# Patient Record
Sex: FEMALE | Race: WHITE | Employment: OTHER | URBAN - METROPOLITAN AREA
[De-identification: names, ages, dates, MRNs, and addresses within clinical notes are randomized per-mention and may not be internally consistent; named-entity substitution may affect disease eponyms.]

---

## 2022-08-24 ENCOUNTER — OUTSIDE SERVICES (OUTPATIENT)
Dept: PRIMARY CARE CLINIC | Age: 82
End: 2022-08-24
Payer: COMMERCIAL

## 2022-08-24 DIAGNOSIS — F25.9 SCHIZOAFFECTIVE DISORDER, UNSPECIFIED TYPE (HCC): ICD-10-CM

## 2022-08-24 DIAGNOSIS — I50.41 ACUTE COMBINED SYSTOLIC AND DIASTOLIC HEART FAILURE (HCC): ICD-10-CM

## 2022-08-24 DIAGNOSIS — I10 HYPERTENSION, UNSPECIFIED TYPE: ICD-10-CM

## 2022-08-24 DIAGNOSIS — F01.50 VASCULAR DEMENTIA, UNCOMPLICATED (HCC): Primary | ICD-10-CM

## 2022-08-24 PROCEDURE — 99308 SBSQ NF CARE LOW MDM 20: CPT | Performed by: INTERNAL MEDICINE

## 2022-08-24 ASSESSMENT — VISUAL ACUITY: OU: 1

## 2022-08-24 ASSESSMENT — ENCOUNTER SYMPTOMS: BLURRED VISION: 0

## 2022-08-24 NOTE — PROGRESS NOTES
Visit Date: 8/3/22  Delbert Chu  1940  female 78 y.o. Subjective:    CC: Patient presents with dementia. Patient presents for follow up of htn, chf .    HPI:  Dementia: Patient feeling about the same compared to last visit. Condition has been mostly well controlled. Taking medication as prescribed. No medication side effects noted. Trying to follow recommended diet. Present for awhile. No precipitating event. Rated as mild to moderate in severity. Has been progressive since onset. Schizophrenia: Patient feeling about the same compared to last visit. Condition has been improving. Taking medication as prescribed. No medication side effects noted. Trying to follow recommended diet. Present for awhile. No precipitating event. Rated as mild to moderate in severity. Has been progressive since onset. Dysphagia. SHE IS DOING WELL IN THERAPY SHE IS NPO AND TOLERATING TUBE FEED SHE IS GAINING WEIGHT Patient feeling about the same compared to last visit. Condition has been improving. Not taking any medication at this time. No medication side effects noted. Trying to follow recommended diet. Present for awhile. Onset was gradual. Progressive. Rated as mild in severity. Reports associated swallowing difficulty. Hypertension  This is a chronic problem. The current episode started more than 1 year ago. The problem is unchanged. The problem is controlled. Pertinent negatives include no anxiety or blurred vision. There are no associated agents to hypertension. There are no known risk factors for coronary artery disease. Treatments tried: taking medications, no medication side effects. There are no compliance problems. Congestive Heart Failure  Presents for follow-up visit. Pertinent negatives include no edema. The symptoms have been stable. ROS:  Review of Systems   Unable to perform ROS: Dementia   Eyes:  Negative for blurred vision.       Current Meds: Refer to nursing home record    PMH:    Medical Problems: reviewed and updated     Surgical Hx: reviewed and updated     FH: reviewed and updated       SH: reviewed and updated       Objective: Wt: 130 BP: 138/72 Pulse: 80 Resp: 17 T: 97.8F     Physical Exam:  Physical Exam  Vitals reviewed. Constitutional:       General: She is awake. She is not in acute distress. Appearance: Normal appearance. She is normal weight. She is not ill-appearing or toxic-appearing. Comments: Confused   Appears appropriate for age   HENT:      Head: Normocephalic and atraumatic. Right Ear: Tympanic membrane and external ear normal.      Left Ear: Tympanic membrane and external ear normal.      Ears:      Comments: Middle ear well aerated. Nose: Nose normal.      Mouth/Throat:      Mouth: Mucous membranes are moist.      Dentition: Normal dentition. No gingival swelling or dental caries. Pharynx: Oropharynx is clear. Uvula midline. Comments: Gums appear healthy. No thrush no mucositis  Eyes:      General: Vision grossly intact. Right eye: No discharge. Left eye: No discharge. Extraocular Movements: Extraocular movements intact. Conjunctiva/sclera: Conjunctivae normal.      Pupils: Pupils are equal, round, and reactive to light. Neck:      Vascular: No carotid bruit. Comments: Thyroid is normal in size. Cardiovascular:      Rate and Rhythm: Normal rate and regular rhythm. Pulses: Normal pulses. Heart sounds: Normal heart sounds, S1 normal and S2 normal.   Pulmonary:      Effort: Pulmonary effort is normal.      Breath sounds: Normal breath sounds. Abdominal:      General: Bowel sounds are normal. There is no distension. Palpations: Abdomen is soft. There is no mass. Tenderness: There is no abdominal tenderness. There is no guarding or rebound. Hernia: No hernia is present. Comments: Peg tube in place No rigidity   Musculoskeletal:         General: Normal range of motion.       Right upper arm: Normal.      Left upper arm: Normal.      Cervical back: Normal range of motion. Right upper leg: Normal.      Left upper leg: Normal.      Right lower leg: Normal.      Left lower leg: Normal.   Lymphadenopathy:      Comments: No palpable or visible regional lymphadenopathy   Skin:     General: Skin is warm and dry. Findings: No bruising, ecchymosis, lesion or rash. Neurological:      General: No focal deficit present. Mental Status: Mental status is at baseline. Cranial Nerves: Cranial nerves are intact. No cranial nerve deficit. Deep Tendon Reflexes: Reflexes normal.      Comments:      Psychiatric:         Mood and Affect: Mood and affect normal. Mood is not depressed. Behavior: Behavior normal. Behavior is not agitated. Thought Content: Thought content normal.         Judgment: Judgment normal.      Comments: No apparent anxiety       Assessment & Plan:  1. Vascular dementia, uncomplicated ([facility]  2. Schizoaffective disorder, unspecified type ([facility] [address]  3. Acute combined systolic and diastolic heart failure ([address]  4. Hypertension, unspecified type     Chart reviewed   Medications reviewed   Monitor labs   Continue current treatment     Bryon LOAIZA MA  am scribing for Dr. Bridgette Mancilla.  [facility]   8/24/22    NATALY Alarcon Verdie How, MD, personally performed the services described in this documentation as scribed by Bryon Kelley and it is both accurate and complete

## 2022-09-07 ENCOUNTER — OUTSIDE SERVICES (OUTPATIENT)
Dept: PRIMARY CARE CLINIC | Age: 82
End: 2022-09-07
Payer: COMMERCIAL

## 2022-09-07 DIAGNOSIS — F01.50 VASCULAR DEMENTIA, UNCOMPLICATED (HCC): Primary | ICD-10-CM

## 2022-09-07 DIAGNOSIS — I50.41 ACUTE COMBINED SYSTOLIC AND DIASTOLIC HEART FAILURE (HCC): ICD-10-CM

## 2022-09-07 DIAGNOSIS — F25.9 SCHIZOAFFECTIVE DISORDER, UNSPECIFIED TYPE (HCC): ICD-10-CM

## 2022-09-07 DIAGNOSIS — I10 HYPERTENSION, UNSPECIFIED TYPE: ICD-10-CM

## 2022-09-07 PROCEDURE — 99309 SBSQ NF CARE MODERATE MDM 30: CPT | Performed by: INTERNAL MEDICINE

## 2022-10-05 ENCOUNTER — OUTSIDE SERVICES (OUTPATIENT)
Dept: PRIMARY CARE CLINIC | Age: 82
End: 2022-10-05
Payer: MEDICARE

## 2022-10-05 DIAGNOSIS — F01.50 VASCULAR DEMENTIA, UNCOMPLICATED (HCC): Primary | ICD-10-CM

## 2022-10-05 DIAGNOSIS — I50.41 ACUTE COMBINED SYSTOLIC AND DIASTOLIC HEART FAILURE (HCC): ICD-10-CM

## 2022-10-05 DIAGNOSIS — I10 HYPERTENSION, UNSPECIFIED TYPE: ICD-10-CM

## 2022-10-05 DIAGNOSIS — F25.9 SCHIZOAFFECTIVE DISORDER, UNSPECIFIED TYPE (HCC): ICD-10-CM

## 2022-10-05 PROCEDURE — 99309 SBSQ NF CARE MODERATE MDM 30: CPT | Performed by: INTERNAL MEDICINE

## 2022-10-10 ASSESSMENT — ENCOUNTER SYMPTOMS: BLURRED VISION: 0

## 2022-10-10 ASSESSMENT — VISUAL ACUITY: OU: 1

## 2022-10-10 NOTE — PROGRESS NOTES
Visit Date: 9/7/22  Wang Chu  1940  female 78 y.o. Subjective:    CC: Patient presents with dementia. Patient presents for follow up of htn, chf .    HPI:  Dysphagia. SHE IS DOING WELL IN THERAPY SHE IS NPO AND TOLERATING TUBE FEED SHE IS GAINING WEIGHT Patient feeling about the same compared to last visit. Condition has been improving. Not taking any medication at this time. No medication side effects noted. Trying to follow recommended diet. Present for awhile. Onset was gradual. Progressive. Rated as mild in severity. Reports associated swallowing difficulty. Hypertension  This is a chronic problem. The current episode started more than 1 year ago. The problem is unchanged. The problem is controlled. Pertinent negatives include no anxiety or blurred vision. There are no associated agents to hypertension. There are no known risk factors for coronary artery disease. Treatments tried: taking medications, no medication side effects. There are no compliance problems. Congestive Heart Failure  Presents for follow-up visit. Pertinent negatives include no edema. The symptoms have been stable. Memory Loss    Patient reports onset of memory loss was more than 1 year ago. Onset quality is gradual.     Symptoms associated with memory loss include changes in short-term memory and changes in long-term memory. Behavorial problems for memory loss include hallucinations. Patient does not have the following behavorial problems associated with memory loss: agitation. Family and/or patient concerns for memory loss include wandering. Patient lives in a/an skilled nursing facility. Mental Health Problem  Primary symptoms comment: schizophrenia. This is a chronic problem. The degree of incapacity that she is experiencing as a consequence of her illness is moderate. She does not have a plan to attempt suicide. She does not contemplate injuring another person.  She has not already  injured another person. Risk factors that are present for mental illness include a history of mental illness. ROS:  Review of Systems   Unable to perform ROS: Dementia   Eyes:  Negative for blurred vision. Current Meds: Refer to nursing home record    PMH:    Medical Problems: reviewed and updated     Surgical Hx: reviewed and updated     FH: reviewed and updated       SH: reviewed and updated       Objective: Wt: 131.1 BP: 128/74 Pulse: 69 Resp: 18 T: 97 F     Physical Exam:  Physical Exam  Vitals reviewed. Constitutional:       General: She is awake. She is not in acute distress. Appearance: Normal appearance. She is normal weight. She is not ill-appearing or toxic-appearing. Comments: Confused   Appears appropriate for age   HENT:      Head: Normocephalic and atraumatic. Right Ear: Tympanic membrane and external ear normal.      Left Ear: Tympanic membrane and external ear normal.      Ears:      Comments: Middle ear well aerated. Nose: Nose normal.      Mouth/Throat:      Mouth: Mucous membranes are moist.      Dentition: Normal dentition. No gingival swelling or dental caries. Pharynx: Oropharynx is clear. Uvula midline. Comments: Gums appear healthy. No thrush no mucositis  Eyes:      General: Vision grossly intact. Right eye: No discharge. Left eye: No discharge. Extraocular Movements: Extraocular movements intact. Conjunctiva/sclera: Conjunctivae normal.      Pupils: Pupils are equal, round, and reactive to light. Neck:      Vascular: No carotid bruit. Comments: Thyroid is normal in size. Cardiovascular:      Rate and Rhythm: Normal rate and regular rhythm. Pulses: Normal pulses. Heart sounds: Normal heart sounds, S1 normal and S2 normal.   Pulmonary:      Effort: Pulmonary effort is normal.      Breath sounds: Normal breath sounds. Abdominal:      General: Bowel sounds are normal. There is no distension.       Palpations: Abdomen is soft. There is no mass. Tenderness: There is no abdominal tenderness. There is no guarding or rebound. Hernia: No hernia is present. Comments: Peg tube in place No rigidity   Musculoskeletal:         General: Normal range of motion. Right upper arm: Normal.      Left upper arm: Normal.      Cervical back: Normal range of motion. Right upper leg: Normal.      Left upper leg: Normal.      Right lower leg: Normal.      Left lower leg: Normal.   Lymphadenopathy:      Comments: No palpable or visible regional lymphadenopathy   Skin:     General: Skin is warm and dry. Findings: No bruising, ecchymosis, lesion or rash. Neurological:      General: No focal deficit present. Mental Status: Mental status is at baseline. Cranial Nerves: No cranial nerve deficit. Deep Tendon Reflexes: Reflexes normal.      Comments:      Psychiatric:         Mood and Affect: Mood and affect normal. Mood is not depressed. Behavior: Behavior normal. Behavior is not agitated. Thought Content: Thought content normal.         Judgment: Judgment normal.      Comments: No apparent anxiety       Assessment & Plan:  1. Vascular dementia, uncomplicated ([address]  2. Schizoaffective disorder, unspecified type ([address]  3. Acute combined systolic and diastolic heart failure ([address]  4. Hypertension, unspecified type     Chart reviewed   Medications reviewed   Monitor labs   Continue current treatment     Emma LOAIZA MA  am scribing for Dr. Signa Libman.  Astrid Bryant, [address] [place] [address] Jing Palacios   IBriseyda MD, personally performed the services described in this documentation as scribed by Emma Rey and it is both accurate and complete

## 2022-11-01 ASSESSMENT — ENCOUNTER SYMPTOMS: BLURRED VISION: 0

## 2022-11-01 ASSESSMENT — VISUAL ACUITY: OU: 1

## 2022-11-01 NOTE — PROGRESS NOTES
Visit Date: 10/5/22  Katelyn Chu  1940  female 78 y.o. Subjective:    CC: Patient presents with dementia. Patient presents for follow up of htn, chf .    HPI:  Dysphagia. SHE IS DOING WELL IN THERAPY SHE IS NPO AND TOLERATING TUBE FEED SHE IS GAINING WEIGHT Patient feeling about the same compared to last visit. Condition has been improving. Not taking any medication at this time. No medication side effects noted. Trying to follow recommended diet. Present for awhile. Onset was gradual. Progressive. Rated as mild in severity. Reports associated swallowing difficulty. Hypertension  This is a chronic problem. The current episode started more than 1 year ago. The problem is unchanged. The problem is controlled. Pertinent negatives include no anxiety or blurred vision. There are no associated agents to hypertension. There are no known risk factors for coronary artery disease. Treatments tried: taking medications, no medication side effects. There are no compliance problems. Congestive Heart Failure  Presents for follow-up visit. Pertinent negatives include no edema. The symptoms have been stable. Memory Loss    Patient reports onset of memory loss was more than 1 year ago. Onset quality is gradual.     Symptoms associated with memory loss include changes in short-term memory and changes in long-term memory. Behavorial problems for memory loss include hallucinations. Patient does not have the following behavorial problems associated with memory loss: agitation. Family and/or patient concerns for memory loss include wandering. Patient lives in a/an skilled nursing facility. Mental Health Problem  Primary symptoms comment: schizophrenia. This is a chronic problem. The degree of incapacity that she is experiencing as a consequence of her illness is moderate. She does not have a plan to attempt suicide. She does not contemplate injuring another person.  She has not already  injured another person. Risk factors that are present for mental illness include a history of mental illness. ROS:  Review of Systems   Unable to perform ROS: Dementia   Eyes:  Negative for blurred vision. Current Meds: Refer to nursing home record    PMH:    Medical Problems: reviewed and updated     Surgical Hx: reviewed and updated     FH: reviewed and updated       SH: reviewed and updated       Objective: Wt: 131.1 BP: 118/60 Pulse: 79 Resp: 18 T: 97.6 F     Physical Exam:  Physical Exam  Vitals reviewed. Constitutional:       General: She is awake. She is not in acute distress. Appearance: Normal appearance. She is normal weight. She is not ill-appearing or toxic-appearing. Comments: Confused   Appears appropriate for age   HENT:      Head: Normocephalic and atraumatic. Right Ear: Tympanic membrane and external ear normal.      Left Ear: Tympanic membrane and external ear normal.      Ears:      Comments: Middle ear well aerated. Nose: Nose normal.      Mouth/Throat:      Mouth: Mucous membranes are moist.      Dentition: Normal dentition. No gingival swelling or dental caries. Pharynx: Oropharynx is clear. Uvula midline. Comments: Gums appear healthy. No thrush no mucositis  Eyes:      General: Vision grossly intact. Right eye: No discharge. Left eye: No discharge. Extraocular Movements: Extraocular movements intact. Conjunctiva/sclera: Conjunctivae normal.      Pupils: Pupils are equal, round, and reactive to light. Neck:      Vascular: No carotid bruit. Comments: Thyroid is normal in size. Cardiovascular:      Rate and Rhythm: Normal rate and regular rhythm. Pulses: Normal pulses. Heart sounds: Normal heart sounds, S1 normal and S2 normal.   Pulmonary:      Effort: Pulmonary effort is normal.      Breath sounds: Normal breath sounds. Abdominal:      General: Bowel sounds are normal. There is no distension.       Palpations: Abdomen is soft. There is no mass. Tenderness: There is no abdominal tenderness. There is no guarding or rebound. Hernia: No hernia is present. Comments: Peg tube in place No rigidity   Musculoskeletal:         General: Normal range of motion. Right upper arm: Normal.      Left upper arm: Normal.      Cervical back: Normal range of motion. Right upper leg: Normal.      Left upper leg: Normal.      Right lower leg: Normal.      Left lower leg: Normal.   Lymphadenopathy:      Comments: No palpable or visible regional lymphadenopathy   Skin:     General: Skin is warm and dry. Findings: No bruising, ecchymosis, lesion or rash. Neurological:      General: No focal deficit present. Mental Status: Mental status is at baseline. Cranial Nerves: No cranial nerve deficit. Deep Tendon Reflexes: Reflexes normal.      Comments:      Psychiatric:         Mood and Affect: Mood and affect normal. Mood is not depressed. Behavior: Behavior normal. Behavior is not agitated. Thought Content: Thought content normal.         Judgment: Judgment normal.      Comments: No apparent anxiety       Assessment & Plan:  1. Vascular dementia, uncomplicated ([facility])  2. Schizoaffective disorder, unspecified type ([facility])  3. Acute combined systolic and diastolic heart failure ([facility])  4. Hypertension, unspecified type     Chart reviewed   Medications reviewed   Monitor labs   Continue current treatment     IRaffi MA  am scribing for Dr. Jose Antony.  [facility] [place]   IClaudia MD, personally performed the services described in this documentation as scribed by Raffi Maguire and it is both accurate and complete

## 2022-11-02 ENCOUNTER — OUTSIDE SERVICES (OUTPATIENT)
Dept: PRIMARY CARE CLINIC | Age: 82
End: 2022-11-02

## 2022-11-02 DIAGNOSIS — I10 HYPERTENSION, UNSPECIFIED TYPE: ICD-10-CM

## 2022-11-02 DIAGNOSIS — F01.50 VASCULAR DEMENTIA, UNCOMPLICATED (HCC): Primary | ICD-10-CM

## 2022-11-02 DIAGNOSIS — I50.41 ACUTE COMBINED SYSTOLIC AND DIASTOLIC HEART FAILURE (HCC): ICD-10-CM

## 2022-11-02 DIAGNOSIS — F25.9 SCHIZOAFFECTIVE DISORDER, UNSPECIFIED TYPE (HCC): ICD-10-CM

## 2022-11-24 ENCOUNTER — HOSPITAL ENCOUNTER (EMERGENCY)
Age: 82
Discharge: HOME | End: 2022-11-24
Attending: EMERGENCY MEDICINE
Payer: MEDICARE

## 2022-11-24 VITALS
DIASTOLIC BLOOD PRESSURE: 60 MMHG | OXYGEN SATURATION: 100 % | RESPIRATION RATE: 16 BRPM | TEMPERATURE: 97.3 F | HEART RATE: 71 BPM | SYSTOLIC BLOOD PRESSURE: 139 MMHG

## 2022-11-24 DIAGNOSIS — Z71.1 NO PROBLEM, FEARED COMPLAINT UNFOUNDED: Primary | ICD-10-CM

## 2022-11-24 PROCEDURE — 99284 EMERGENCY DEPT VISIT MOD MDM: CPT

## 2022-11-24 ASSESSMENT — ENCOUNTER SYMPTOMS
NAUSEA: 0
SORE THROAT: 0
SINUS PAIN: 0
DIARRHEA: 0
EYE DISCHARGE: 0
ABDOMINAL PAIN: 0
CONSTIPATION: 0
SHORTNESS OF BREATH: 0
COUGH: 0
COLOR CHANGE: 0
VOMITING: 0

## 2022-11-24 ASSESSMENT — PAIN - FUNCTIONAL ASSESSMENT: PAIN_FUNCTIONAL_ASSESSMENT: NONE - DENIES PAIN

## 2022-11-24 ASSESSMENT — LIFESTYLE VARIABLES: HOW OFTEN DO YOU HAVE A DRINK CONTAINING ALCOHOL: NEVER

## 2022-11-24 NOTE — ED NOTES
G-tube functioning properly, flushed and aspirated with no difficulty     Sherlyn Mayo RN  11/24/22 3970

## 2022-11-24 NOTE — ED PROVIDER NOTES
Tres Leroy is an 78 y.o. female with PEG tube and on oxygen from nursing facility. Patient is a 78 y.o. female presents with a chief complaint of G-tube issue  This has been occurring for this morning. Patient states that it gets better with nothing. Patient states that it gets worse with nothing. Patient states that it is  not causing any pain, rated 0/10  in severity. Patient states it was acute in onset. Monisha Handler reports that nursing facility has not been changing the dressing on her G-tube, but she is denying any pain, nausea, vomiting, fevers. She states that there is no issue with the G-tube, however when nursing called the facility they reported this morning there was leakage around the G-tube while trying to flush it. She is denying any pain or any other symptoms at the moment including chest pain, shortness of breath, cough, headache, lightheaded or dizziness, numbness or tingling, changes in urination, changes in stool, rash, or leg swelling. Review of Systems   Constitutional:  Negative for chills and fever. HENT:  Negative for congestion, sinus pain and sore throat. Eyes:  Negative for discharge and visual disturbance. Respiratory:  Negative for cough and shortness of breath. Cardiovascular:  Negative for chest pain and leg swelling. Gastrointestinal:  Negative for abdominal pain, constipation, diarrhea, nausea and vomiting. Endocrine: Negative for polyuria. Genitourinary:  Negative for difficulty urinating, dysuria, frequency and hematuria. Musculoskeletal:  Negative for arthralgias and joint swelling. Skin:  Negative for color change and rash. Neurological:  Negative for dizziness, weakness, light-headedness, numbness and headaches. All other systems reviewed and are negative. Physical Exam  Constitutional:       General: She is not in acute distress. Appearance: Normal appearance. HENT:      Head: Normocephalic and atraumatic.       Mouth/Throat: Mouth: Mucous membranes are moist.      Pharynx: Oropharynx is clear. Eyes:      Extraocular Movements: Extraocular movements intact. Conjunctiva/sclera: Conjunctivae normal.      Pupils: Pupils are equal, round, and reactive to light. Cardiovascular:      Rate and Rhythm: Normal rate and regular rhythm. Pulses: Normal pulses. Heart sounds: Normal heart sounds. Pulmonary:      Effort: Pulmonary effort is normal.      Breath sounds: Normal breath sounds. Abdominal:      General: Abdomen is flat. Palpations: Abdomen is soft. Comments: PEG tube in place with no apparent leakage, surrounding erythema or signs of infection. Musculoskeletal:         General: No swelling. Normal range of motion. Cervical back: Normal range of motion and neck supple. Skin:     General: Skin is warm and dry. Neurological:      General: No focal deficit present. Mental Status: She is alert and oriented to person, place, and time. Psychiatric:         Mood and Affect: Mood normal.         Behavior: Behavior normal.        Procedures     MDM  Number of Diagnoses or Management Options  Diagnosis management comments: Nicole Saldana is an 78 y.o. female brought to ED from nursing facility for G-tube malfunction. In ED, pt denying any pain. PEG tube was flushed easily, without any issue. Plan to DC back to nursing facility discussed with patient. ED Course as of 11/24/22 0656   Thu Nov 24, 2022   5451 Nursing reported G-tube flushing well without any pain. [CP]      ED Course User Index  [CP] Alberto Cunha DO        ED Course as of 11/24/22 0656   Thu Nov 24, 2022   8863 Nursing reported G-tube flushing well without any pain. [CP]      ED Course User Index  [CP] Alberto Cunha DO       --------------------------------------------- PAST HISTORY ---------------------------------------------  Past Medical History:  has no past medical history on file.     Past Surgical History:  has no past surgical history on file. Social History:  reports that she has never smoked. She has never used smokeless tobacco. She reports that she does not drink alcohol and does not use drugs. Family History: family history is not on file. The patients home medications have been reviewed. Allergies: Patient has no known allergies. -------------------------------------------------- RESULTS -------------------------------------------------  Labs:  No results found for this visit on 11/24/22. Radiology:  No orders to display       ------------------------- NURSING NOTES AND VITALS REVIEWED ---------------------------  Date / Time Roomed:  11/24/2022  6:23 AM  ED Bed Assignment:  07/07    The nursing notes within the ED encounter and vital signs as below have been reviewed. /60   Pulse 71   Temp 97.3 °F (36.3 °C) (Oral)   Resp 16   SpO2 100%   Oxygen Saturation Interpretation: Normal      ------------------------------------------ PROGRESS NOTES ------------------------------------------  6:55 AM EST  I have spoken with the patient and discussed todays results, in addition to providing specific details for the plan of care and counseling regarding the diagnosis and prognosis. Their questions are answered at this time and they are agreeable with the plan. I discussed at length with them reasons for immediate return here for re evaluation. They will followup with their primary care physician by calling their office on Monday.      --------------------------------- ADDITIONAL PROVIDER NOTES ---------------------------------  At this time the patient is without objective evidence of an acute process requiring hospitalization or inpatient management. They have remained hemodynamically stable throughout their entire ED visit and are stable for discharge with outpatient follow-up.      The plan has been discussed in detail and they are aware of the specific conditions for emergent return, as well as the importance of follow-up. New Prescriptions    No medications on file       Diagnosis:  1. No problem, feared complaint unfounded        Disposition:  Patient's disposition: Discharge to nursing home  Patient's condition is stable. Patient was given return precautions. Labs were interpreted by me. Patient will follow up with their primary care provider. Patient is agreeable to this plan. Patient has remained stable throughout their stay in the ED. Patient was seen and evaluated by myself and my attending Emili Jalloh DO. Assessment and Plan discussed with attending provider, please see attestation for final plan of care. This note was done using dictation software and there may be some grammatical errors associated with this.     Maverick Peres 55, DO  Resident  11/24/22 0581

## 2022-12-01 ENCOUNTER — HOSPITAL ENCOUNTER (EMERGENCY)
Age: 82
Discharge: HOME | End: 2022-12-02
Attending: EMERGENCY MEDICINE
Payer: MEDICARE

## 2022-12-01 ENCOUNTER — APPOINTMENT (OUTPATIENT)
Dept: GENERAL RADIOLOGY | Age: 82
End: 2022-12-01
Payer: MEDICARE

## 2022-12-01 VITALS
RESPIRATION RATE: 20 BRPM | HEART RATE: 93 BPM | WEIGHT: 125 LBS | TEMPERATURE: 98.2 F | DIASTOLIC BLOOD PRESSURE: 64 MMHG | OXYGEN SATURATION: 99 % | SYSTOLIC BLOOD PRESSURE: 132 MMHG

## 2022-12-01 DIAGNOSIS — Z43.1 PEG (PERCUTANEOUS ENDOSCOPIC GASTROSTOMY) ADJUSTMENT/REPLACEMENT/REMOVAL (HCC): Primary | ICD-10-CM

## 2022-12-01 DIAGNOSIS — J11.1 INFLUENZA WITH RESPIRATORY MANIFESTATION OTHER THAN PNEUMONIA: ICD-10-CM

## 2022-12-01 LAB
INFLUENZA A BY PCR: DETECTED
INFLUENZA B BY PCR: NOT DETECTED
SARS-COV-2, NAAT: NOT DETECTED

## 2022-12-01 PROCEDURE — 71045 X-RAY EXAM CHEST 1 VIEW: CPT

## 2022-12-01 PROCEDURE — 74018 RADEX ABDOMEN 1 VIEW: CPT

## 2022-12-01 PROCEDURE — 43762 RPLC GTUBE NO REVJ TRC: CPT

## 2022-12-01 PROCEDURE — 99284 EMERGENCY DEPT VISIT MOD MDM: CPT

## 2022-12-01 PROCEDURE — 87635 SARS-COV-2 COVID-19 AMP PRB: CPT

## 2022-12-01 PROCEDURE — 6360000004 HC RX CONTRAST MEDICATION

## 2022-12-01 PROCEDURE — 87502 INFLUENZA DNA AMP PROBE: CPT

## 2022-12-01 RX ADMIN — DIATRIZOATE MEGLUMINE AND DIATRIZOATE SODIUM 30 ML: 600; 100 SOLUTION ORAL; RECTAL at 19:07

## 2022-12-01 ASSESSMENT — ENCOUNTER SYMPTOMS
SORE THROAT: 0
COLOR CHANGE: 0
CONSTIPATION: 0
SINUS PAIN: 0
SHORTNESS OF BREATH: 0
NAUSEA: 0
EYE DISCHARGE: 0
COUGH: 1
ABDOMINAL PAIN: 0
DIARRHEA: 0
VOMITING: 0

## 2022-12-01 NOTE — ED PROVIDER NOTES
Nicole Saldana is an 78 y.o. female with PEG tube and on oxygen at baseline at nursing facility       Patient is a 78 y.o. female presents with a chief complaint of PEG tube dislodged  This has been occurring since 1100 today. Patient states that it gets better with nothing. Patient states that it gets worse with nothing. Patient states that it is causing no pain,  rated 0/10  in severity. Patient states it was acute in onset. She notes that this morning her PEG tube came out around 1100 this morning. Pt is denying any pain, but does note she has had a mild cough the past two days. She denies any HA, lightheaded or dizziness, numbness or tingling, CP, SOB, abd pain, N/V/D, leg swelling, rashes, fevers, or chills. Review of Systems   Constitutional:  Negative for chills and fever. HENT:  Negative for congestion, sinus pain and sore throat. Eyes:  Negative for discharge and visual disturbance. Respiratory:  Positive for cough. Negative for shortness of breath. Cardiovascular:  Negative for chest pain and leg swelling. Gastrointestinal:  Negative for abdominal pain, constipation, diarrhea, nausea and vomiting. PEG tube dislodged   Endocrine: Negative for polyuria. Genitourinary:  Negative for difficulty urinating, dysuria, frequency and hematuria. Musculoskeletal:  Negative for arthralgias and joint swelling. Skin:  Negative for color change and rash. Neurological:  Negative for dizziness, weakness, light-headedness, numbness and headaches. All other systems reviewed and are negative. Physical Exam  Constitutional:       General: She is not in acute distress. Appearance: Normal appearance. HENT:      Head: Normocephalic and atraumatic. Mouth/Throat:      Mouth: Mucous membranes are moist.      Pharynx: Oropharynx is clear. Eyes:      Extraocular Movements: Extraocular movements intact.       Conjunctiva/sclera: Conjunctivae normal.      Pupils: Pupils are equal, round, and reactive to light. Cardiovascular:      Rate and Rhythm: Normal rate and regular rhythm. Pulses: Normal pulses. Heart sounds: Normal heart sounds. Pulmonary:      Effort: Pulmonary effort is normal.      Breath sounds: Rhonchi present. Abdominal:      Palpations: Abdomen is soft. Comments: Periumbilical hernia chronic, present and unchanged; no redness, warmth, or signs of infection. Reducible. PEG tube dislodged; tunnel is patent and without any signs of surrounding infection   Musculoskeletal:         General: No swelling. Normal range of motion. Cervical back: Normal range of motion and neck supple. Skin:     General: Skin is warm and dry. Neurological:      General: No focal deficit present. Mental Status: She is alert and oriented to person, place, and time. Psychiatric:         Mood and Affect: Mood normal.         Behavior: Behavior normal.        Procedures     PROCEDURE  12/1/22       Time: 1800    FEEDING TUBE REPLACEMENT  Risks, benefits and alternatives (for applicable procedures below) described. Performed By: Jamel Barrera, DO and EM Attending Physician. Indication: Tube fell out. Informed consent: Verbal consent obtained. The patient was counseled regarding the procedure in person, it's indications, risks, potential complications and alternatives and any questions were answered. Verbal consent was obtained. Local Anesthesia:  not required/indicated. Procedure: A feeding tube was replaced using a 20 Maur[place]ian gastrostomy tube and the bulb was inflated using 10 cc of air. Tube was secured to skin pre-attached rubber skin bumper device                                                          . Post-Procedure: Tube position confirmed by x-ray with contrast injection. Patient tolerated the procedure well. Complications:  None.         MDM  Number of Diagnoses or Management Options  PEG (percutaneous endoscopic gastrostomy) adjustment/replacement/removal [facility])  Diagnosis management comments: Janel Arango is an 78 y.o. female brought to ED for dislodged PEG tube. In ED, PE acutely unremarkable aside from mild coarse breath sounds, cough and PEG tube displaced. COVID swab was negative. Influenza testing did reveal influenza A. Chest x-ray revealing COPD otherwise no pneumonia. PEG tube was replaced in ED with 20 Urdu tube, and balloon inflated with 10 cc of air, without any difficulties. KUB with Gastrografin revealing satisfactory placement of gastric PEG tube. Spoke with patient regarding today's findings. Plan to discharge back to facility and advised to follow-up with PCP discussed with patient. She is agreeable to plan.                    --------------------------------------------- PAST HISTORY ---------------------------------------------  Past Medical History:  has no past medical history on file. Past Surgical History:  has no past surgical history on file. Social History:  reports that she has never smoked. She has never used smokeless tobacco. She reports that she does not drink alcohol and does not use drugs. Family History: family history is not on file. The patients home medications have been reviewed. Allergies: Patient has no known allergies. -------------------------------------------------- RESULTS -------------------------------------------------  Labs:  Results for orders placed or performed during the hospital encounter of 12/01/22   COVID-19, Rapid    Specimen: Nasopharyngeal Swab   Result Value Ref Range    SARS-CoV-2, NAAT Not Detected Not Detected   Rapid influenza A/B antigens    Specimen: Nasopharyngeal   Result Value Ref Range    Influenza A by PCR DETECTED (A) Not Detected    Influenza B by PCR Not Detected Not Detected       Radiology:  XR ABDOMEN (KUB) (SINGLE AP VIEW)   Final Result   Satisfactory position of gastric PEG tube. XR CHEST 1 VIEW   Final Result   COPD.   There are no findings of failure or pneumonia.             ------------------------- NURSING NOTES AND VITALS REVIEWED ---------------------------  Date / Time Roomed:  12/1/2022  1:09 PM  ED Bed Assignment:  HALL/H3    The nursing notes within the ED encounter and vital signs as below have been reviewed. /64   Pulse 93   Temp 98.2 °F (36.8 °C) (Oral)   Resp 20   Wt 125 lb (56.7 kg)   SpO2 99%   Oxygen Saturation Interpretation: Normal      ------------------------------------------ PROGRESS NOTES ------------------------------------------  10:52 PM EST  I have spoken with the patient and discussed todays results, in addition to providing specific details for the plan of care and counseling regarding the diagnosis and prognosis. Their questions are answered at this time and they are agreeable with the plan. I discussed at length with them reasons for immediate return here for re evaluation. They will followup with their primary care physician by calling their office tomorrow. --------------------------------- ADDITIONAL PROVIDER NOTES ---------------------------------  At this time the patient is without objective evidence of an acute process requiring hospitalization or inpatient management. They have remained hemodynamically stable throughout their entire ED visit and are stable for discharge with outpatient follow-up. The plan has been discussed in detail and they are aware of the specific conditions for emergent return, as well as the importance of follow-up. New Prescriptions    No medications on file       Diagnosis:  1. PEG (percutaneous endoscopic gastrostomy) adjustment/replacement/removal ([facility]        Disposition:  Patient's disposition: Discharge to nursing home  Patient's condition is stable. Patient was given return precautions. Labs were interpreted by me. Patient will follow up with their primary care provider. Patient is agreeable to this plan.  Patient has remained stable throughout their stay in the ED. Patient was seen and evaluated by myself and my attending Olivia Cabrera DO. Assessment and Plan discussed with attending provider, please see attestation for final plan of care. This note was done using dictation software and there may be some grammatical errors associated with this.     Maverick Peres 55, DO  Resident  12/01/22 0344

## 2022-12-02 NOTE — ED NOTES
Pt. Report called to [facility] at the [facility].      Deni Hernandez RN  12/01/22 2037

## 2022-12-07 ENCOUNTER — OUTSIDE SERVICES (OUTPATIENT)
Dept: PRIMARY CARE CLINIC | Age: 82
End: 2022-12-07

## 2022-12-07 DIAGNOSIS — J44.9 CHRONIC OBSTRUCTIVE PULMONARY DISEASE, UNSPECIFIED COPD TYPE (HCC): ICD-10-CM

## 2022-12-07 DIAGNOSIS — I10 HYPERTENSION, UNSPECIFIED TYPE: ICD-10-CM

## 2022-12-07 DIAGNOSIS — M19.91 PRIMARY OSTEOARTHRITIS, UNSPECIFIED SITE: ICD-10-CM

## 2022-12-07 DIAGNOSIS — F01.50 VASCULAR DEMENTIA, UNCOMPLICATED (HCC): Primary | ICD-10-CM

## 2022-12-07 DIAGNOSIS — F25.9 SCHIZOAFFECTIVE DISORDER, UNSPECIFIED TYPE (HCC): ICD-10-CM

## 2022-12-07 DIAGNOSIS — I50.41 ACUTE COMBINED SYSTOLIC AND DIASTOLIC HEART FAILURE (HCC): ICD-10-CM

## 2022-12-07 DIAGNOSIS — I48.0 PAROXYSMAL ATRIAL FIBRILLATION (HCC): ICD-10-CM

## 2022-12-14 ASSESSMENT — VISUAL ACUITY: OU: 1

## 2022-12-14 NOTE — PROGRESS NOTES
Visit Date: 11/2/22  Sixto Chu  1940  female 78 y.o. Subjective:    CC: Patient presents with dementia. Patient presents for follow up of htn, chf .    HPI:  Dysphagia. SHE IS DOING WELL IN THERAPY SHE IS NPO AND TOLERATING TUBE FEED SHE IS GAINING WEIGHT Patient feeling about the same compared to last visit. Condition has been improving. Not taking any medication at this time. No medication side effects noted. Trying to follow recommended diet. Present for awhile. Onset was gradual. Progressive. Rated as mild in severity. Reports associated swallowing difficulty. Hypertension  This is a chronic problem. The current episode started more than 1 year ago. The problem is unchanged. The problem is controlled. Pertinent negatives include no anxiety. There are no associated agents to hypertension. There are no known risk factors for coronary artery disease. Treatments tried: taking medications, no medication side effects. There are no compliance problems. Congestive Heart Failure  Presents for follow-up visit. Pertinent negatives include no edema. The symptoms have been stable. Memory Loss    Patient reports onset of memory loss was more than 1 year ago. Onset quality is gradual.     Symptoms associated with memory loss include changes in short-term memory and changes in long-term memory. Behavorial problems for memory loss include hallucinations. Patient does not have the following behavorial problems associated with memory loss: agitation. Family and/or patient concerns for memory loss include wandering. Patient lives in a/an skilled nursing facility. Mental Health Problem  Primary symptoms comment: schizophrenia. This is a chronic problem. The degree of incapacity that she is experiencing as a consequence of her illness is moderate. She does not have a plan to attempt suicide. She does not contemplate injuring another person. She has not already  injured another person.  Risk factors that are present for mental illness include a history of mental illness. ROS:  Review of Systems   Unable to perform ROS: Dementia      Current Meds: Refer to nursing home record    PMH:  Past Medical History:  has no past medical history on file. Unable to obtain due to dementia      Past Surgical History:  has no past surgical history on file. Unable to obtain due to dementia      Social History:  reports that she has never smoked. She has never used smokeless tobacco. She reports that she does not drink alcohol and does not use drugs. Family History: family history is not on file. unable to obtain due to dementia         Objective: Wt: 130 BP: 122/64 Pulse: 69 Resp: 18 T: 97.9 F     Physical Exam:  Physical Exam  Vitals reviewed. Constitutional:       General: She is awake. She is not in acute distress. Appearance: Normal appearance. She is normal weight. She is not ill-appearing or toxic-appearing. Comments: Confused   Appears appropriate for age   HENT:      Head: Normocephalic and atraumatic. Right Ear: Tympanic membrane and external ear normal.      Left Ear: Tympanic membrane and external ear normal.      Ears:      Comments: Middle ear well aerated. Nose: Nose normal.      Mouth/Throat:      Mouth: Mucous membranes are moist.      Dentition: Normal dentition. No gingival swelling or dental caries. Pharynx: Oropharynx is clear. Uvula midline. Comments: Gums appear healthy. No thrush no mucositis  Eyes:      General: Vision grossly intact. Right eye: No discharge. Left eye: No discharge. Extraocular Movements: Extraocular movements intact. Conjunctiva/sclera: Conjunctivae normal.      Pupils: Pupils are equal, round, and reactive to light. Neck:      Vascular: No carotid bruit. Comments: Thyroid is normal in size. Cardiovascular:      Rate and Rhythm: Normal rate and regular rhythm. Pulses: Normal pulses.       Heart sounds: Normal heart sounds, S1 normal and S2 normal.   Pulmonary:      Effort: Pulmonary effort is normal.      Breath sounds: Normal breath sounds. Abdominal:      General: Bowel sounds are normal. There is no distension. Palpations: Abdomen is soft. There is no mass. Tenderness: There is no abdominal tenderness. There is no guarding or rebound. Hernia: No hernia is present. Comments: Peg tube in place No rigidity   Musculoskeletal:         General: Normal range of motion. Right upper arm: Normal.      Left upper arm: Normal.      Cervical back: Normal range of motion. Right upper leg: Normal.      Left upper leg: Normal.      Right lower leg: Normal.      Left lower leg: Normal.   Lymphadenopathy:      Comments: No palpable or visible regional lymphadenopathy   Skin:     General: Skin is warm and dry. Findings: No bruising, ecchymosis, lesion or rash. Neurological:      General: No focal deficit present. Mental Status: Mental status is at baseline. Cranial Nerves: No cranial nerve deficit. Deep Tendon Reflexes: Reflexes normal.      Comments:      Psychiatric:         Mood and Affect: Mood and affect normal. Mood is not depressed. Behavior: Behavior normal. Behavior is not agitated. Cognition and Memory: Cognition is impaired. Memory is impaired. Comments: No apparent anxiety       Assessment & Plan:  1. Vascular dementia, uncomplicated ([address]  2. Schizoaffective disorder, unspecified type ([address]  3. Acute combined systolic and diastolic heart failure ([address]  4. Hypertension, unspecified type     Chart reviewed   Medications reviewed   Monitor labs   Continue current treatment     Taurus LOAIZA MA  am scribing for Dr. Anthony Hall.  Marie Valles, [address] [place] [address] Philip   I, Jarocho Smith MD, personally performed the services described in this documentation as scribed by Taurus Fong and it is both accurate and complete

## 2023-01-11 ASSESSMENT — COPD QUESTIONNAIRES: COPD: 1

## 2023-01-11 ASSESSMENT — VISUAL ACUITY: OU: 1

## 2023-01-11 NOTE — PROGRESS NOTES
Visit Date: 12/7/22  Sharita Chu  1940  female 78 y.o. Subjective:    CC: Patient presents with dementia. Patient presents for follow up of htn, chf .    HPI:  Dysphagia. SHE IS DOING WELL IN THERAPY SHE IS NPO AND TOLERATING TUBE FEED SHE has lost some weightPatient feeling about the same compared to last visit. Condition has been improving. Not taking any medication at this time. No medication side effects noted. Trying to follow recommended diet. Present for awhile. Onset was gradual. Progressive. Rated as mild in severity. Reports associated swallowing difficulty. Hypertension  This is a chronic problem. The current episode started more than 1 year ago. The problem is unchanged. The problem is controlled. Pertinent negatives include no anxiety. There are no associated agents to hypertension. There are no known risk factors for coronary artery disease. Treatments tried: taking medications, no medication side effects. There are no compliance problems. Congestive Heart Failure  Presents for follow-up visit. Pertinent negatives include no edema. The symptoms have been stable. Memory Loss    Patient reports onset of memory loss was more than 1 year ago. Onset quality is gradual.     Symptoms associated with memory loss include changes in short-term memory and changes in long-term memory. Behavorial problems for memory loss include hallucinations. Patient does not have the following behavorial problems associated with memory loss: agitation. Family and/or patient concerns for memory loss include wandering. Patient lives in a/an skilled nursing facility. Mental Health Problem  Primary symptoms comment: schizophrenia. This is a chronic problem. The degree of incapacity that she is experiencing as a consequence of her illness is moderate. She does not have a plan to attempt suicide. She does not contemplate injuring another person. She has not already  injured another person.  Risk factors that are present for mental illness include a history of mental illness. Atrial Fibrillation  Presents for follow-up visit. The symptoms have been stable. Compliance problems: not taking any medications. COPD  This is a chronic problem. The current episode started more than 1 year ago. The problem occurs constantly. The problem has been unchanged. Her symptoms are aggravated by climbing stairs and strenuous activity. Relieved by: using 02 and albuterol. She reports minimal improvement on treatment. Her past medical history is significant for COPD.     ROS:  Review of Systems   Unable to perform ROS: Dementia      Current Meds: Refer to nursing home record    PMH:  Past Medical History:  has no past medical history on file. Unable to obtain due to dementia      Past Surgical History:  has no past surgical history on file. Unable to obtain due to dementia      Social History:  reports that she has never smoked. She has never used smokeless tobacco. She reports that she does not drink alcohol and does not use drugs. Family History: family history is not on file. unable to obtain due to dementia         Objective: Wt: 126.8 BP: 118/66 Pulse: 72 Resp: 18 T: 97.1 F     Physical Exam:  Physical Exam  Vitals reviewed. Constitutional:       General: She is awake. She is not in acute distress. Appearance: She is normal weight. She is not ill-appearing or toxic-appearing. Comments: Confused   Appears appropriate for age   HENT:      Head: Normocephalic and atraumatic. Right Ear: Tympanic membrane and external ear normal.      Left Ear: Tympanic membrane and external ear normal.      Ears:      Comments: Middle ear well aerated. Nose: Nose normal.      Mouth/Throat:      Mouth: Mucous membranes are moist.      Dentition: Normal dentition. No gingival swelling or dental caries. Pharynx: Oropharynx is clear. Uvula midline. Comments: Gums appear healthy.    No thrush no mucositis  Eyes:      General: Vision grossly intact. Right eye: No discharge. Left eye: No discharge. Extraocular Movements: Extraocular movements intact. Conjunctiva/sclera: Conjunctivae normal.      Pupils: Pupils are equal, round, and reactive to light. Neck:      Vascular: No carotid bruit. Comments: Thyroid is normal in size. Cardiovascular:      Rate and Rhythm: Normal rate and regular rhythm. Pulses: Normal pulses. Heart sounds: Normal heart sounds, S1 normal and S2 normal.   Pulmonary:      Effort: Pulmonary effort is normal.      Breath sounds: Normal breath sounds. Abdominal:      General: Bowel sounds are normal. There is no distension. Palpations: Abdomen is soft. There is no mass. Tenderness: There is no abdominal tenderness. There is no guarding or rebound. Hernia: No hernia is present. Comments: Peg tube in place No rigidity   Musculoskeletal:         General: Normal range of motion. Right upper arm: Normal.      Left upper arm: Normal.      Cervical back: Normal range of motion. Right upper leg: Normal.      Left upper leg: Normal.      Right lower leg: Normal.      Left lower leg: Normal.   Lymphadenopathy:      Comments: No palpable or visible regional lymphadenopathy   Skin:     General: Skin is warm and dry. Findings: No bruising, ecchymosis, lesion or rash. Neurological:      General: No focal deficit present. Mental Status: Mental status is at baseline. Cranial Nerves: No cranial nerve deficit. Deep Tendon Reflexes: Reflexes normal.      Comments:      Psychiatric:         Mood and Affect: Mood and affect normal. Mood is not depressed. Behavior: Behavior is not agitated. Behavior is cooperative. Cognition and Memory: Cognition is impaired. Memory is impaired. Comments:  anxiety       Assessment & Plan:  1.  Vascular dementia, uncomplicated ([facility]  2. Schizoaffective disorder, unspecified type (HCC)  3. Acute combined systolic and diastolic heart failure (HCC)  4. Hypertension, unspecified type  5. Paroxysmal atrial fibrillation (HCC)  6. Chronic obstructive pulmonary disease, unspecified COPD type (HCC)  7. Primary osteoarthritis, unspecified site     Chart reviewed   Medications reviewed   Dementia with anxiety taking medications doing well   Chf stable   Htn blood pressure well controlled   Afib was started on eliquis 2.5mg bid was stopped because bleeding continue to monitor   O.a on oxycodone bid for pain control   Copd on o2 and albuterol doing well   Monitor labs   Continue current treatment     IDonna MA  am scribing for Dr. JYOTI Enriquez MA     I, Liss Ramirez MD, personally performed the services described in this documentation as scribed by Donna Enriquez and it is both accurate and complete